# Patient Record
Sex: MALE | Race: WHITE | Employment: FULL TIME | URBAN - METROPOLITAN AREA
[De-identification: names, ages, dates, MRNs, and addresses within clinical notes are randomized per-mention and may not be internally consistent; named-entity substitution may affect disease eponyms.]

---

## 2023-05-17 ENCOUNTER — APPOINTMENT (OUTPATIENT)
Dept: ULTRASOUND IMAGING | Age: 28
End: 2023-05-17

## 2023-05-17 ENCOUNTER — HOSPITAL ENCOUNTER (EMERGENCY)
Age: 28
Discharge: HOME | End: 2023-05-18
Attending: EMERGENCY MEDICINE

## 2023-05-17 DIAGNOSIS — N45.1 ACUTE EPIDIDYMITIS: Primary | ICD-10-CM

## 2023-05-17 LAB
BACTERIA URNS QL MICRO: ABNORMAL /HPF
BILIRUB UR QL STRIP.AUTO: NEGATIVE
CASTS #/AREA URNS LPF: ABNORMAL /LPF
CLARITY UR: CLEAR
COLOR UR: YELLOW
EPI CELLS #/AREA URNS HPF: ABNORMAL /HPF (ref 0–5)
GLUCOSE UR STRIP.AUTO-MCNC: NEGATIVE MG/DL
HGB UR QL STRIP.AUTO: NEGATIVE
KETONES UR STRIP.AUTO-MCNC: NEGATIVE MG/DL
LEUKOCYTE ESTERASE UR QL STRIP.AUTO: ABNORMAL
MUCOUS THREADS #/AREA URNS LPF: ABNORMAL /LPF
NITRITE UR QL STRIP.AUTO: NEGATIVE
PH UR STRIP.AUTO: 6.5 [PH] (ref 5–8)
PROT UR STRIP.AUTO-MCNC: NEGATIVE MG/DL
RBC #/AREA URNS HPF: ABNORMAL /HPF (ref 0–4)
SP GR UR STRIP.AUTO: 1.02 (ref 1–1.03)
UA DIPSTICK W REFLEX MICRO PNL UR: YES
URN SPEC COLLECT METH UR: ABNORMAL
UROBILINOGEN UR STRIP-ACNC: 0.2 E.U./DL
WBC #/AREA URNS HPF: ABNORMAL /HPF (ref 0–5)

## 2023-05-17 PROCEDURE — 87591 N.GONORRHOEAE DNA AMP PROB: CPT

## 2023-05-17 PROCEDURE — 96374 THER/PROPH/DIAG INJ IV PUSH: CPT

## 2023-05-17 PROCEDURE — 99284 EMERGENCY DEPT VISIT MOD MDM: CPT

## 2023-05-17 PROCEDURE — 96375 TX/PRO/DX INJ NEW DRUG ADDON: CPT

## 2023-05-17 PROCEDURE — 81001 URINALYSIS AUTO W/SCOPE: CPT

## 2023-05-17 PROCEDURE — 6360000002 HC RX W HCPCS

## 2023-05-17 PROCEDURE — 93975 VASCULAR STUDY: CPT

## 2023-05-17 PROCEDURE — 96372 THER/PROPH/DIAG INJ SC/IM: CPT

## 2023-05-17 PROCEDURE — 76870 US EXAM SCROTUM: CPT

## 2023-05-17 PROCEDURE — 87491 CHLMYD TRACH DNA AMP PROBE: CPT

## 2023-05-17 RX ORDER — DOXYCYCLINE 100 MG/1
100 CAPSULE ORAL ONCE
Status: COMPLETED | OUTPATIENT
Start: 2023-05-17 | End: 2023-05-18

## 2023-05-17 RX ORDER — DOXYCYCLINE HYCLATE 100 MG/1
100 CAPSULE ORAL 2 TIMES DAILY
Qty: 14 CAPSULE | Refills: 0 | Status: SHIPPED | OUTPATIENT
Start: 2023-05-17 | End: 2023-05-24

## 2023-05-17 RX ORDER — MORPHINE SULFATE 2 MG/ML
2 INJECTION, SOLUTION INTRAMUSCULAR; INTRAVENOUS ONCE
Status: COMPLETED | OUTPATIENT
Start: 2023-05-17 | End: 2023-05-17

## 2023-05-17 RX ORDER — KETOROLAC TROMETHAMINE 30 MG/ML
15 INJECTION, SOLUTION INTRAMUSCULAR; INTRAVENOUS ONCE
Status: COMPLETED | OUTPATIENT
Start: 2023-05-17 | End: 2023-05-18

## 2023-05-17 RX ADMIN — MORPHINE SULFATE 2 MG: 2 INJECTION, SOLUTION INTRAMUSCULAR; INTRAVENOUS at 22:00

## 2023-05-17 ASSESSMENT — ENCOUNTER SYMPTOMS
CONSTIPATION: 0
NAUSEA: 0
CHOKING: 0
COUGH: 0
DIARRHEA: 0
CHEST TIGHTNESS: 0
SHORTNESS OF BREATH: 0
TROUBLE SWALLOWING: 0
ABDOMINAL PAIN: 0
EYE REDNESS: 0
ABDOMINAL DISTENTION: 0

## 2023-05-17 ASSESSMENT — PAIN DESCRIPTION - PAIN TYPE: TYPE: ACUTE PAIN

## 2023-05-17 ASSESSMENT — PAIN SCALES - GENERAL
PAINLEVEL_OUTOF10: 7
PAINLEVEL_OUTOF10: 7

## 2023-05-17 ASSESSMENT — PAIN DESCRIPTION - ORIENTATION
ORIENTATION: RIGHT
ORIENTATION: RIGHT

## 2023-05-17 ASSESSMENT — PAIN - FUNCTIONAL ASSESSMENT
PAIN_FUNCTIONAL_ASSESSMENT: ACTIVITIES ARE NOT PREVENTED
PAIN_FUNCTIONAL_ASSESSMENT: 0-10

## 2023-05-17 ASSESSMENT — PAIN DESCRIPTION - LOCATION
LOCATION: SCROTUM
LOCATION: SCROTUM

## 2023-05-17 ASSESSMENT — PAIN DESCRIPTION - ONSET: ONSET: SUDDEN

## 2023-05-17 ASSESSMENT — PAIN DESCRIPTION - DESCRIPTORS: DESCRIPTORS: SQUEEZING

## 2023-05-17 ASSESSMENT — PAIN DESCRIPTION - FREQUENCY: FREQUENCY: CONTINUOUS

## 2023-05-17 NOTE — ED TRIAGE NOTES
Pt presents to ED for testicle pain that started Monday night. Pt denies injury to scrotum. States right testicle hurts worse than left.

## 2023-05-18 VITALS
HEART RATE: 72 BPM | DIASTOLIC BLOOD PRESSURE: 57 MMHG | TEMPERATURE: 97.9 F | OXYGEN SATURATION: 100 % | RESPIRATION RATE: 12 BRPM | WEIGHT: 211 LBS | HEIGHT: 68 IN | BODY MASS INDEX: 31.98 KG/M2 | SYSTOLIC BLOOD PRESSURE: 124 MMHG

## 2023-05-18 LAB
C TRACH DNA UR QL NAA+PROBE: NEGATIVE
N GONORRHOEA DNA UR QL NAA+PROBE: NEGATIVE

## 2023-05-18 PROCEDURE — 87086 URINE CULTURE/COLONY COUNT: CPT

## 2023-05-18 PROCEDURE — 6360000002 HC RX W HCPCS: Performed by: EMERGENCY MEDICINE

## 2023-05-18 PROCEDURE — 2500000003 HC RX 250 WO HCPCS: Performed by: EMERGENCY MEDICINE

## 2023-05-18 PROCEDURE — 6370000000 HC RX 637 (ALT 250 FOR IP): Performed by: EMERGENCY MEDICINE

## 2023-05-18 RX ADMIN — DOXYCYCLINE 100 MG: 100 CAPSULE ORAL at 00:09

## 2023-05-18 RX ADMIN — LIDOCAINE HYDROCHLORIDE 500 MG: 10 INJECTION, SOLUTION EPIDURAL; INFILTRATION; INTRACAUDAL; PERINEURAL at 00:09

## 2023-05-18 RX ADMIN — KETOROLAC TROMETHAMINE 15 MG: 30 INJECTION, SOLUTION INTRAMUSCULAR at 00:09

## 2023-05-18 NOTE — DISCHARGE INSTRUCTIONS
Your ultrasound shows epididymitis, which is infection of the tubing above the testicle. Please take antibiotics as prescribed until the bottle is empty. Use Tylenol or ibuprofen as needed for pain. If symptoms do not improve after the antibiotics, follow-up with the urologist listed above.

## 2023-05-18 NOTE — ED PROVIDER NOTES
[facility]  EMERGENCY DEPARTMENT ENCOUNTER          [facility] RESIDENT NOTE       Date of evaluation: 5/17/2023    Chief Complaint     Testicle Pain (Started Monday night. Pt denies injury. )      History of Present Illness     Bing Brandt is a 33 y.o. male who presents with right-sided testicular pain. Pain started on Monday, patient describes quality of pain like someone is squeezing his testicle, pain can go up to 7/10 of intensity. Pain has been waxing and waning. Patient reports white discharge from his urethra yesterday. Patient denies any sexual encounter in the last 6 months. Patient denies any trauma or recent event related to the onset of his testicular pain. Patient denies any increased urinary frequency, dysuria. ASSESSMENT / PLAN  (MEDICAL DECISION MAKING)     INITIAL VITALS: BP: 136/76, Temp: 97.9 °F (36.6 °C), Pulse: 72, Respirations: 12, SpO2: 100 %     Bing Brandt is a 33 y.o. male with no significant past medical history. Patient presented with testicular pain, waxing and waning, 7 out of 10 in intensity. Patient also reports urethral whitish discharge. Patient denies any sexual encounter in the last 6 months, denies any trauma or recent event related to the onset of the testicular pain. Patient was transferred from [place] [address] for further work-up. On exam right testicle is tender. No color changes. Cremasteric reflex negative. Urinalysis, GC and Chlamydia PCR, testicular Doppler ultrasound were ordered. At this moment we will transition her care to my attending physician Dr. Loretta Soto, who will follow-up work-up and patient clinical evolution. Is this patient to be included in the SEP-1 core measure due to severe sepsis or septic shock? No Exclusion criteria - the patient is NOT to be included for SEP-1 Core Measure due to: 2+ SIRS criteria are not met    Medical Decision Making  Amount and/or Complexity of Data Reviewed  Radiology: ordered.           Clinical
[facility] ENCOUNTER          ATTENDING PHYSICIAN NOTE       Date of evaluation: 5/17/2023    ADDENDUM:      Care of this patient was assumed from Dr. Rachele Litten under the supervision of Dr. Estiven Kennedy.  The patient was seen for Testicle Pain (Started Monday night. Pt denies injury. )  . The patient's initial evaluation and plan have been discussed with the prior provider who initially evaluated the patient. Nursing Notes, Past Medical Hx, Past Surgical Hx, Social Hx, Allergies, and Family Hx were all reviewed. Patient is a 27-year-old male with no significant medical history presents complaining of testicular pain. Patient states his pain is been going on for approximately 2 days with a sensation of squeezing to the testicles that is been waxing and waning. He denies any dysuria but had noted some discharge. Patient was seen initially at [facility] where they were concerned for testicular torsion so transferred him here for ultrasound. On arrival, patient is hemodynamically stable. He does have reproducible testicular tenderness on exam, predominantly over the epididymis. At time of turnover, urinalysis and testicular ultrasound was pending. ASSESSMENT / PLAN  ([facility])     Yefri Alexandra is a 33 y.o. male presents complaining of intermittent testicular pain has been present for the last 2 days. Patient denies dysuria but has noted penile discharge. On exam, patient does have testicular tenderness on exam, predominantly over the epididymis. Urinalysis does show 21-50 white blood cells with 1-3 cellular casts present but only rare bacteria and negative nitrites. Urine culture was sent. Ultrasound does show bilateral epididymitis. I feel most likely patient's symptoms are secondary to epididymitis. Have low suspicion for torsion based on the ultrasound findings and do not feel emergent urology consultation is indicated.   While he
ED Attending Attestation Note     Date of evaluation: 5/17/2023    This patient was seen by the resident. I have seen and examined the patient, agree with the workup, evaluation, management and diagnosis. The care plan has been discussed. My assessment reveals 26-year-old male who is otherwise previously healthy presenting to the emergency department in transfer from [facility] for rule out testicular torsion. Patient reports pain for the past 3 days. Intermittent, most recent episode for about the past 4-1/2 hours. Has some associated testicular swelling but no color changes. Severe, unrelieved with home over-the-counter analgesics. Also reports some penile discharge but denies dysuria and hematuria. States that he has not been sexually active for the past 6 months. On exam patient is afebrile, hemodynamically stable, no acute distress. Right testicle is tender with no definite focality such as posterior or inferior pole, not objectively swollen as compared to the left. Normal lie and no color changes. Cremasteric reflex was not able to be elicited for either testicle. No penile discharge is appreciated. No rashes.       Tiana Reardon MD  [facility]  Emergency Medicine     Tiana Reardon MD  05/17/23 6254
as CT, Ultrasound and MRI are read by the radiologist. Plain radiographic images are visualized by myself. *    Interpretation per the Radiologist below, if available at the time of this note:    No orders to display         PROCEDURES   Unless otherwise noted below, none     Procedures    *    CRITICAL CARE TIME   N/A      EMERGENCY DEPARTMENT COURSE and DIFFERENTIALDIAGNOSIS/MDM:   Vitals:    Vitals:    05/17/23 1933   BP: 136/76   Pulse: 72   Resp: 12   Temp: 97.9 °F (36.6 °C)   TempSrc: Oral   SpO2: 100%   Weight: 211 lb (95.7 kg)   Height: 5' 8\" (1.727 m)       Patient was given thefollowing medications:  Medications - No data to display        The patient tolerated their visit well. The patient and / or the familywere informed of the results of any tests, a time was given to answer questions. FINAL IMPRESSION      1. Pain in right testicle    Given that we do not have testicular ultrasound here I will be transferring the patient to [facility]. I discussed the case with the emergency department tending Dr. Tayo Bower who agreed to take the patient in transfer    DISPOSITION/PLAN   DISPOSITION Decision To Transfer 05/17/2023 07:37:12 PM      PATIENT REFERRED TO:  No follow-up provider specified.     DISCHARGE MEDICATIONS:  New Prescriptions    No medications on file       DISCONTINUED MEDICATIONS:  Discontinued Medications    No medications on file              (Please note that portions of this note were completed with a voice recognition program.  Efforts were made to edit the dictations but occasionally words are mis-transcribed.)    Hira Moore MD (electronically signed)       Hira Moore MD  05/17/23 1950

## 2023-05-19 LAB — BACTERIA UR CULT: NORMAL
